# Patient Record
Sex: MALE | URBAN - METROPOLITAN AREA
[De-identification: names, ages, dates, MRNs, and addresses within clinical notes are randomized per-mention and may not be internally consistent; named-entity substitution may affect disease eponyms.]

---

## 2022-04-05 ENCOUNTER — APPOINTMENT (RX ONLY)
Dept: URBAN - METROPOLITAN AREA CLINIC 332 | Facility: CLINIC | Age: 77
Setting detail: DERMATOLOGY
End: 2022-04-05

## 2022-04-05 DIAGNOSIS — L909 UNSPECIFIED HYPERTROPHIC AND ATROPHIC CONDITIONS OF SKIN: ICD-10-CM

## 2022-04-05 DIAGNOSIS — L21.8 OTHER SEBORRHEIC DERMATITIS: ICD-10-CM | Status: INADEQUATELY CONTROLLED

## 2022-04-05 DIAGNOSIS — L71.8 OTHER ROSACEA: ICD-10-CM | Status: INADEQUATELY CONTROLLED

## 2022-04-05 DIAGNOSIS — L919 UNSPECIFIED HYPERTROPHIC AND ATROPHIC CONDITIONS OF SKIN: ICD-10-CM

## 2022-04-05 DIAGNOSIS — L987 UNSPECIFIED HYPERTROPHIC AND ATROPHIC CONDITIONS OF SKIN: ICD-10-CM

## 2022-04-05 PROBLEM — D48.5 NEOPLASM OF UNCERTAIN BEHAVIOR OF SKIN: Status: ACTIVE | Noted: 2022-04-05

## 2022-04-05 PROCEDURE — ? PRESCRIPTION

## 2022-04-05 PROCEDURE — ? COUNSELING

## 2022-04-05 PROCEDURE — ? ADDITIONAL NOTES

## 2022-04-05 PROCEDURE — 99204 OFFICE O/P NEW MOD 45 MIN: CPT | Mod: 25

## 2022-04-05 PROCEDURE — 11102 TANGNTL BX SKIN SINGLE LES: CPT

## 2022-04-05 PROCEDURE — ? BIOPSY BY SHAVE METHOD

## 2022-04-05 RX ORDER — FLUOCINONIDE 0.5 MG/ML
SOLUTION TOPICAL
Qty: 60 | Refills: 2 | Status: ERX | COMMUNITY
Start: 2022-04-05

## 2022-04-05 RX ORDER — METRONIDAZOLE 7.5 MG/G
CREAM TOPICAL
Qty: 45 | Refills: 2 | Status: ERX | COMMUNITY
Start: 2022-04-05

## 2022-04-05 RX ORDER — DOXYCYCLINE HYCLATE 100 MG/1
CAPSULE, GELATIN COATED ORAL
Qty: 60 | Refills: 0 | Status: ERX | COMMUNITY
Start: 2022-04-05

## 2022-04-05 RX ADMIN — METRONIDAZOLE: 7.5 CREAM TOPICAL at 00:00

## 2022-04-05 RX ADMIN — DOXYCYCLINE HYCLATE: 100 CAPSULE, GELATIN COATED ORAL at 00:00

## 2022-04-05 RX ADMIN — FLUOCINONIDE: 0.5 SOLUTION TOPICAL at 00:00

## 2022-04-05 ASSESSMENT — LOCATION ZONE DERM
LOCATION ZONE: LEG
LOCATION ZONE: FACE
LOCATION ZONE: NECK

## 2022-04-05 ASSESSMENT — LOCATION DETAILED DESCRIPTION DERM
LOCATION DETAILED: RIGHT CENTRAL MALAR CHEEK
LOCATION DETAILED: MID POSTERIOR NECK
LOCATION DETAILED: LEFT ANTERIOR PROXIMAL THIGH

## 2022-04-05 ASSESSMENT — LOCATION SIMPLE DESCRIPTION DERM
LOCATION SIMPLE: POSTERIOR NECK
LOCATION SIMPLE: RIGHT CHEEK
LOCATION SIMPLE: LEFT THIGH

## 2022-04-05 NOTE — HPI: EVALUATION OF SKIN LESION(S)
What Type Of Note Output Would You Prefer (Optional)?: Bullet Format
Hpi Title: Evaluation of Skin Lesions
Additional History: Pt has an irritated skin tag to the waistline and a breakout all over the face and neck

## 2022-05-12 ENCOUNTER — APPOINTMENT (RX ONLY)
Dept: URBAN - METROPOLITAN AREA CLINIC 332 | Facility: CLINIC | Age: 77
Setting detail: DERMATOLOGY
End: 2022-05-12

## 2022-05-12 DIAGNOSIS — L21.8 OTHER SEBORRHEIC DERMATITIS: ICD-10-CM | Status: RESOLVED

## 2022-05-12 DIAGNOSIS — L71.8 OTHER ROSACEA: ICD-10-CM | Status: INADEQUATELY CONTROLLED

## 2022-05-12 PROCEDURE — 99214 OFFICE O/P EST MOD 30 MIN: CPT

## 2022-05-12 PROCEDURE — ? PRESCRIPTION

## 2022-05-12 PROCEDURE — ? PRESCRIPTION MEDICATION MANAGEMENT

## 2022-05-12 PROCEDURE — ? ADDITIONAL NOTES

## 2022-05-12 PROCEDURE — ? COUNSELING

## 2022-05-12 RX ORDER — METRONIDAZOLE 7.5 MG/G
GEL TOPICAL
Qty: 45 | Refills: 3 | Status: ERX | COMMUNITY
Start: 2022-05-12

## 2022-05-12 RX ORDER — DOXYCYCLINE HYCLATE 20 MG/1
TABLET, FILM COATED ORAL
Qty: 60 | Refills: 3 | Status: ERX | COMMUNITY
Start: 2022-05-12

## 2022-05-12 RX ORDER — CLINDAMYCIN PHOSPHATE 10 MG/ML
LOTION TOPICAL
Qty: 60 | Refills: 3 | Status: ERX | COMMUNITY
Start: 2022-05-12

## 2022-05-12 RX ADMIN — METRONIDAZOLE: 7.5 GEL TOPICAL at 00:00

## 2022-05-12 RX ADMIN — CLINDAMYCIN PHOSPHATE: 10 LOTION TOPICAL at 00:00

## 2022-05-12 RX ADMIN — DOXYCYCLINE HYCLATE: 20 TABLET, FILM COATED ORAL at 00:00

## 2022-05-12 ASSESSMENT — LOCATION SIMPLE DESCRIPTION DERM
LOCATION SIMPLE: RIGHT CHEEK
LOCATION SIMPLE: ANTERIOR SCALP

## 2022-05-12 ASSESSMENT — LOCATION ZONE DERM
LOCATION ZONE: SCALP
LOCATION ZONE: FACE

## 2022-05-12 ASSESSMENT — LOCATION DETAILED DESCRIPTION DERM
LOCATION DETAILED: RIGHT CENTRAL MALAR CHEEK
LOCATION DETAILED: MID-FRONTAL SCALP

## 2022-05-12 NOTE — PROCEDURE: PRESCRIPTION MEDICATION MANAGEMENT
Detail Level: Zone
Initiate Treatment: Start clindamycin lotion QDAY. Start BPO wash daily
Continue Regimen: Continue doxycycline but at new dosing. Continue using metrogel QHS
Render In Strict Bullet Format?: No
Continue Regimen: Continue using fluocinonide solution as rx’d

## 2022-05-12 NOTE — HPI: RASH (ROSACEA)
Is This A New Presentation, Or A Follow-Up?: Follow Up Rosacea
Additional History: Pt improved on the face, still having flares to the back of neck